# Patient Record
Sex: MALE | Race: OTHER | URBAN - METROPOLITAN AREA
[De-identification: names, ages, dates, MRNs, and addresses within clinical notes are randomized per-mention and may not be internally consistent; named-entity substitution may affect disease eponyms.]

---

## 2019-04-03 ENCOUNTER — IMPORTED ENCOUNTER (OUTPATIENT)
Dept: URBAN - METROPOLITAN AREA CLINIC 50 | Facility: CLINIC | Age: 69
End: 2019-04-03

## 2019-06-04 ENCOUNTER — IMPORTED ENCOUNTER (OUTPATIENT)
Dept: URBAN - METROPOLITAN AREA CLINIC 50 | Facility: CLINIC | Age: 69
End: 2019-06-04

## 2019-06-04 NOTE — PATIENT DISCUSSION
"""Patient has not checked his FBS. He is unsure of his last A1c but believes it to be """"6 somthing"""". ""

## 2020-12-04 ENCOUNTER — IMPORTED ENCOUNTER (OUTPATIENT)
Dept: URBAN - METROPOLITAN AREA CLINIC 50 | Facility: CLINIC | Age: 70
End: 2020-12-04

## 2021-04-17 ASSESSMENT — TONOMETRY
OD_IOP_MMHG: 11
OS_IOP_MMHG: 14
OD_IOP_MMHG: 12
OS_IOP_MMHG: 13

## 2021-04-17 ASSESSMENT — VISUAL ACUITY
OD_BAT: 20/60
OS_OTHER: 20/60. 20/200.
OD_SC: 20/25+2
OS_SC: 20/40
OD_CC: 20/25-2
OD_OTHER: 20/60. 20/400-.
OS_CC: 20/30-1
OS_CC: J1+
OD_CC: J1+
OS_BAT: 20/60

## 2021-11-30 ENCOUNTER — PREPPED CHART (OUTPATIENT)
Dept: URBAN - METROPOLITAN AREA CLINIC 53 | Facility: CLINIC | Age: 71
End: 2021-11-30

## 2021-12-07 ENCOUNTER — COMPREHENSIVE EXAM (OUTPATIENT)
Dept: URBAN - METROPOLITAN AREA CLINIC 53 | Facility: CLINIC | Age: 71
End: 2021-12-07

## 2021-12-07 DIAGNOSIS — H11.151: ICD-10-CM

## 2021-12-07 DIAGNOSIS — H18.002: ICD-10-CM

## 2021-12-07 DIAGNOSIS — E11.9: ICD-10-CM

## 2021-12-07 DIAGNOSIS — H25.13: ICD-10-CM

## 2021-12-07 PROCEDURE — 92015 DETERMINE REFRACTIVE STATE: CPT

## 2021-12-07 PROCEDURE — 92014 COMPRE OPH EXAM EST PT 1/>: CPT

## 2021-12-07 ASSESSMENT — TONOMETRY
OD_IOP_MMHG: 10
OS_IOP_MMHG: 12

## 2021-12-07 ASSESSMENT — VISUAL ACUITY
OU_CC: 20/20
OD_CC: J1+ @ 14IN
OS_SC: J2 @ 14IN
OS_GLARE: 20/25
OD_SC: 20/20
OS_GLARE: 20/25
OU_SC: J2 @ 14IN
OD_GLARE: 20/20
OS_SC: 20/40
OS_CC: J1+ @ 14IN
OD_CC: 20/20
OD_SC: J10 @ 14IN
OD_GLARE: 20/20
OU_CC: J1+ @ 14IN
OS_CC: 20/25

## 2021-12-07 ASSESSMENT — KERATOMETRY
OD_K2POWER_DIOPTERS: 42.50
OS_AXISANGLE_DEGREES: 156
OS_AXISANGLE2_DEGREES: 66
OD_AXISANGLE2_DEGREES: 49
OD_AXISANGLE_DEGREES: 139
OD_K1POWER_DIOPTERS: 42.00
OS_K2POWER_DIOPTERS: 43.00
OS_K1POWER_DIOPTERS: 42.50

## 2022-12-13 ENCOUNTER — COMPREHENSIVE EXAM (OUTPATIENT)
Dept: URBAN - METROPOLITAN AREA CLINIC 53 | Facility: CLINIC | Age: 72
End: 2022-12-13

## 2022-12-13 PROCEDURE — 92015 DETERMINE REFRACTIVE STATE: CPT

## 2022-12-13 PROCEDURE — 92014 COMPRE OPH EXAM EST PT 1/>: CPT

## 2022-12-13 ASSESSMENT — KERATOMETRY
OD_K2POWER_DIOPTERS: 42.50
OS_AXISANGLE_DEGREES: 180
OS_K2POWER_DIOPTERS: 42.50
OD_K1POWER_DIOPTERS: 42.25
OD_AXISANGLE2_DEGREES: 59
OS_AXISANGLE2_DEGREES: 90
OS_K1POWER_DIOPTERS: 42.50
OD_AXISANGLE_DEGREES: 149

## 2022-12-13 ASSESSMENT — TONOMETRY
OD_IOP_MMHG: 16
OS_IOP_MMHG: 16

## 2022-12-13 ASSESSMENT — VISUAL ACUITY
OU_CC: J1+ @ 16"
OS_GLARE: 20/25
OS_CC: 20/20-2
OD_GLARE: 20/25
OS_GLARE: 20/20
OD_GLARE: 20/20
OU_CC: 20/20-1
OD_CC: 20/20

## 2022-12-13 NOTE — PATIENT DISCUSSION
GLAUCOMA SUSPECT-C/D: The patient is a glaucoma suspect because of suspicious appearance of the optic disc(s). IOP stable today, 16 OU. RTC in 5-6 months for follow up with IOP check/Pachs and HVF/OCT RNFL. (-) family hx.

## 2023-10-11 ENCOUNTER — COMPREHENSIVE EXAM (OUTPATIENT)
Dept: URBAN - METROPOLITAN AREA CLINIC 53 | Facility: CLINIC | Age: 73
End: 2023-10-11

## 2023-10-11 DIAGNOSIS — H40.013: ICD-10-CM

## 2023-10-11 DIAGNOSIS — H11.151: ICD-10-CM

## 2023-10-11 DIAGNOSIS — H52.4: ICD-10-CM

## 2023-10-11 DIAGNOSIS — H18.002: ICD-10-CM

## 2023-10-11 DIAGNOSIS — E11.9: ICD-10-CM

## 2023-10-11 DIAGNOSIS — H25.13: ICD-10-CM

## 2023-10-11 PROCEDURE — 92014 COMPRE OPH EXAM EST PT 1/>: CPT

## 2023-10-11 PROCEDURE — 92015 DETERMINE REFRACTIVE STATE: CPT

## 2023-10-11 PROCEDURE — 92083 EXTENDED VISUAL FIELD XM: CPT

## 2023-10-11 PROCEDURE — 92133 CPTRZD OPH DX IMG PST SGM ON: CPT

## 2023-10-11 ASSESSMENT — KERATOMETRY
OS_AXISANGLE2_DEGREES: 90
OD_K2POWER_DIOPTERS: 42.50
OD_AXISANGLE2_DEGREES: 59
OS_AXISANGLE_DEGREES: 180
OS_K1POWER_DIOPTERS: 42.50
OD_K1POWER_DIOPTERS: 42.25
OS_K2POWER_DIOPTERS: 42.50
OD_AXISANGLE_DEGREES: 149

## 2023-10-11 ASSESSMENT — VISUAL ACUITY
OD_CC: 20/20
OS_GLARE: 20/25
OD_GLARE: 20/25
OD_CC: J1+
OS_CC: 20/60-1
OS_CC: J1
OS_PH: 20/30+2

## 2023-10-11 ASSESSMENT — TONOMETRY
OS_IOP_MMHG: 10
OD_IOP_MMHG: 10

## 2023-11-06 ENCOUNTER — DIAGNOSTICS ONLY (OUTPATIENT)
Dept: URBAN - METROPOLITAN AREA CLINIC 53 | Facility: CLINIC | Age: 73
End: 2023-11-06

## 2023-11-06 DIAGNOSIS — H40.013: ICD-10-CM

## 2023-11-06 PROCEDURE — 92083 EXTENDED VISUAL FIELD XM: CPT

## 2023-11-06 ASSESSMENT — KERATOMETRY
OS_K1POWER_DIOPTERS: 42.50
OD_K1POWER_DIOPTERS: 42.25
OD_AXISANGLE_DEGREES: 149
OS_K2POWER_DIOPTERS: 42.50
OD_AXISANGLE2_DEGREES: 59
OD_K2POWER_DIOPTERS: 42.50
OS_AXISANGLE_DEGREES: 180
OS_AXISANGLE2_DEGREES: 90

## 2024-05-15 ENCOUNTER — FOLLOW UP (OUTPATIENT)
Dept: URBAN - METROPOLITAN AREA CLINIC 53 | Facility: CLINIC | Age: 74
End: 2024-05-15

## 2024-05-15 DIAGNOSIS — H18.002: ICD-10-CM

## 2024-05-15 DIAGNOSIS — H40.013: ICD-10-CM

## 2024-05-15 DIAGNOSIS — H25.13: ICD-10-CM

## 2024-05-15 DIAGNOSIS — H11.151: ICD-10-CM

## 2024-05-15 PROCEDURE — 92133 CPTRZD OPH DX IMG PST SGM ON: CPT

## 2024-05-15 PROCEDURE — 99212 OFFICE O/P EST SF 10 MIN: CPT

## 2024-05-15 ASSESSMENT — TONOMETRY
OS_IOP_MMHG: 13
OD_IOP_MMHG: 12

## 2024-05-15 ASSESSMENT — VISUAL ACUITY
OD_CC: 20/25
OS_CC: 20/25-2

## 2024-11-05 ENCOUNTER — DIAGNOSTICS ONLY (OUTPATIENT)
Dept: URBAN - METROPOLITAN AREA CLINIC 53 | Facility: CLINIC | Age: 74
End: 2024-11-05

## 2024-11-05 DIAGNOSIS — H40.013: ICD-10-CM

## 2024-11-05 PROCEDURE — 92083 EXTENDED VISUAL FIELD XM: CPT

## 2024-11-05 PROCEDURE — 92133 CPTRZD OPH DX IMG PST SGM ON: CPT

## 2025-03-04 ENCOUNTER — COMPREHENSIVE EXAM (OUTPATIENT)
Age: 75
End: 2025-03-04

## 2025-03-04 DIAGNOSIS — H18.002: ICD-10-CM

## 2025-03-04 DIAGNOSIS — H52.4: ICD-10-CM

## 2025-03-04 DIAGNOSIS — H11.151: ICD-10-CM

## 2025-03-04 DIAGNOSIS — H40.013: ICD-10-CM

## 2025-03-04 DIAGNOSIS — E11.9: ICD-10-CM

## 2025-03-04 DIAGNOSIS — H25.13: ICD-10-CM

## 2025-03-04 PROCEDURE — 99214 OFFICE O/P EST MOD 30 MIN: CPT

## 2025-03-04 PROCEDURE — 92015 DETERMINE REFRACTIVE STATE: CPT
